# Patient Record
Sex: FEMALE | Race: WHITE | NOT HISPANIC OR LATINO | Employment: OTHER | URBAN - METROPOLITAN AREA
[De-identification: names, ages, dates, MRNs, and addresses within clinical notes are randomized per-mention and may not be internally consistent; named-entity substitution may affect disease eponyms.]

---

## 2018-05-01 LAB — TSH SERPL DL<=0.05 MIU/L-ACNC: 0.49 UIU/ML (ref 0.47–4.68)

## 2018-07-31 ENCOUNTER — TELEPHONE (OUTPATIENT)
Dept: FAMILY MEDICINE CLINIC | Facility: CLINIC | Age: 83
End: 2018-07-31

## 2018-08-21 DIAGNOSIS — E78.5 HYPERLIPIDEMIA, UNSPECIFIED HYPERLIPIDEMIA TYPE: Primary | ICD-10-CM

## 2018-08-21 RX ORDER — SIMVASTATIN 40 MG
TABLET ORAL
Qty: 90 TABLET | Refills: 3 | Status: SHIPPED | OUTPATIENT
Start: 2018-08-21 | End: 2019-07-30 | Stop reason: SDUPTHER

## 2018-09-05 ENCOUNTER — OFFICE VISIT (OUTPATIENT)
Dept: FAMILY MEDICINE CLINIC | Facility: CLINIC | Age: 83
End: 2018-09-05
Payer: COMMERCIAL

## 2018-09-05 VITALS
TEMPERATURE: 95.6 F | SYSTOLIC BLOOD PRESSURE: 116 MMHG | RESPIRATION RATE: 16 BRPM | HEIGHT: 64 IN | HEART RATE: 74 BPM | DIASTOLIC BLOOD PRESSURE: 78 MMHG | WEIGHT: 108 LBS | BODY MASS INDEX: 18.44 KG/M2

## 2018-09-05 DIAGNOSIS — I10 ESSENTIAL HYPERTENSION: ICD-10-CM

## 2018-09-05 DIAGNOSIS — E03.9 ACQUIRED HYPOTHYROIDISM: Primary | ICD-10-CM

## 2018-09-05 DIAGNOSIS — Z23 NEED FOR INFLUENZA VACCINATION: ICD-10-CM

## 2018-09-05 PROBLEM — G47.01 INSOMNIA DUE TO MEDICAL CONDITION: Status: ACTIVE | Noted: 2018-06-12

## 2018-09-05 PROCEDURE — 90662 IIV NO PRSV INCREASED AG IM: CPT

## 2018-09-05 PROCEDURE — 99214 OFFICE O/P EST MOD 30 MIN: CPT | Performed by: FAMILY MEDICINE

## 2018-09-05 PROCEDURE — 3078F DIAST BP <80 MM HG: CPT | Performed by: FAMILY MEDICINE

## 2018-09-05 PROCEDURE — 1036F TOBACCO NON-USER: CPT | Performed by: FAMILY MEDICINE

## 2018-09-05 PROCEDURE — 3008F BODY MASS INDEX DOCD: CPT | Performed by: FAMILY MEDICINE

## 2018-09-05 PROCEDURE — 1160F RVW MEDS BY RX/DR IN RCRD: CPT

## 2018-09-05 PROCEDURE — 90471 IMMUNIZATION ADMIN: CPT

## 2018-09-05 PROCEDURE — 3074F SYST BP LT 130 MM HG: CPT | Performed by: FAMILY MEDICINE

## 2018-09-05 PROCEDURE — 1160F RVW MEDS BY RX/DR IN RCRD: CPT | Performed by: FAMILY MEDICINE

## 2018-09-05 RX ORDER — LEVOTHYROXINE SODIUM 0.15 MG/1
150 TABLET ORAL EVERY 24 HOURS
COMMUNITY
Start: 2018-03-01 | End: 2019-03-13 | Stop reason: SDUPTHER

## 2018-09-05 RX ORDER — NITROGLYCERIN 0.4 MG/1
1 TABLET SUBLINGUAL AS NEEDED
COMMUNITY
Start: 2015-08-05

## 2018-09-05 RX ORDER — MULTIVIT-MIN/IRON/FOLIC ACID/K 18-600-40
2000 CAPSULE ORAL 2 TIMES DAILY
COMMUNITY
Start: 2015-01-14

## 2018-09-05 RX ORDER — SELEGILINE HYDROCHLORIDE 5 MG/1
2.5 TABLET ORAL DAILY
COMMUNITY
Start: 2018-06-12 | End: 2019-03-05

## 2018-09-05 NOTE — PATIENT INSTRUCTIONS
Melatonin take 1 mg 2 hr before sleep and use for at least 3 weeks to see if it works  If affective continue it  IF YOU HAVE NEVER HAD A TDAP SHOT (TETANUS/DIPHTHERIA/PERTUSSIS)  TALK TO YOUR PHARMACIST ABOUT GETTING ONE : GOOD FOR 10 YRS:ESPECIALLY IMPORTATN IF YOU HAVE YOUNG CHILDREN OR GRANDCHILDREN    GET A YEARLY FLU SHOT IN THE FALL : IF OVER 65 GET "HIGH DOSE"     ASK PHARMACIST ABOUT "SHINGRIX" THE NEW SHINGLES VACCINE IF OVER AGE 50      CURRENT [employer] TIPS ON EXERCISE:    IF YOU HAVE CONDITIONS THAT PREVENT AEROBIC EXERCISE:    WALK 30 MINUTES AT LEAST 5 DAYS PER WEEK; MAY BREAK IT UP INTO 10-  15 MINUTE SESSIONS   GET SOME RESISTANCE EXERCISES USING THE MAJOR MUSCLE GROUPS 2-3 TIMES PER WEEK     IF YOU ARE PHYSICALLY ABLE:    TRY TO GET 10,000 STEPS 3 TIMES PER WEEK  PLUS  GO "ONLINE" TO CHECK TARGET HEART RATE FOR YOUR AGE AND DO AEROBIC EXERCISES (JOG/STATIONARY BIKE/ELLIPTICAL) FOR 20-30 MINUTES 2-3 TIMES PER WEEK      MEASURES THAT HELP PREVENT FALLS:    DRINK PLENTY OF FLUIDS : 2-3 QTS PER DAY : URINE SHOULD BE LIGHT COLOR  GET REGULAR EYE CHECK UP  USE NIGHT LIGHTS  ELIMINATE ALL THROW RUGS  DO SOME WALKING EVERY DAY AND BALANCE EXERCISES  USE NON SLIP FOOT WEARBATH MATS AND GRAB BARS HELP  WHEN RISING: ALWAYS WAIT FOR 15-20 SECONDS BEFORE INITIATING WALK  CONSIDER DOING YOGA OR [facility]AC[facility]O [facility] MEASURES THAT HELP PREVENT FALLS:    DRINK PLENTY OF FLUIDS : 2-3 QTS PER DAY : URINE SHOULD BE LIGHT COLOR  GET REGULAR EYE CHECK UP  USE NIGHT LIGHTS  ELIMINATE ALL THROW RUGS  DO SOME WALKING EVERY DAY AND BALANCE EXERCISES  USE NON SLIP FOOT WEARBATH MATS AND GRAB BARS HELP  WHEN RISING: ALWAYS WAIT FOR 15-20 SECONDS BEFORE INITIATING WALK  CONSIDER DOING YOGA OR JAC[facility]O [facility]

## 2018-09-05 NOTE — PROGRESS NOTES
Assessment/Plan:    Acquired hypothyroidism  Controlled     Parkinson's disease ([facility] [address] )  As  Above : some nightmares        Diagnoses and all orders for this visit:    Acquired hypothyroidism    Essential hypertension    Other orders  -     levothyroxine (SYNTHROID) 150 mcg tablet; Take 150 mcg by mouth every 24 hours  -     selegiline (ELDEPRYL) 5 mg tablet; Take 2 5 mg by mouth daily  -     carbidopa-levodopa (SINEMET)  mg per tablet; Take 1 tablet by mouth 3 (three) times a day  -     Discontinue: aspirin 81 MG tablet; every 24 hours          Subjective:      Patient ID: Valorie Hampton is a 81 y o  female  Patient here for regular checkup  No recent hospital stays or emergency visits  She does get checkups from the cardiologist and the neurologist and the dermatologist on a regular basis  The following portions of the patient's history were reviewed and updated as appropriate: allergies, current medications, past family history, past medical history, past social history, past surgical history and problem list     Review of Systems   Constitutional: Negative for activity change and appetite change  HENT: Negative for voice change  Eyes: Negative for visual disturbance  Respiratory: Negative for chest tightness and shortness of breath  Cardiovascular: Negative for chest pain, palpitations and leg swelling  Gastrointestinal: Negative for abdominal pain, blood in stool, constipation and diarrhea  Genitourinary: Negative for dysuria, vaginal bleeding and vaginal discharge  Skin: Negative for rash  Neurological: Negative for dizziness  Psychiatric/Behavioral: Negative for dysphoric mood           Objective:  Vitals:    09/05/18 0847   BP: 116/78   BP Location: Left arm   Patient Position: Sitting   Cuff Size: Standard   Pulse: 74   Resp: 16   Temp: (!) 95 6 °F (35 3 °C)   TempSrc: Oral   Weight: 49 kg (108 lb)   Height: 5' 4" (1 626 m)      Physical Exam   Constitutional: She appears well-developed and well-nourished  HENT:   Head: Normocephalic and atraumatic  Eyes: Conjunctivae are normal    Neck: Neck supple  No thyromegaly present  Cardiovascular: Normal rate, regular rhythm, normal heart sounds and intact distal pulses  No murmur heard  Pulmonary/Chest: Effort normal and breath sounds normal  No respiratory distress  Musculoskeletal: She exhibits no edema  Lymphadenopathy:     She has no cervical adenopathy  Skin: Skin is warm and dry  Psychiatric: She has a normal mood and affect   Her behavior is normal

## 2019-03-05 ENCOUNTER — OFFICE VISIT (OUTPATIENT)
Dept: FAMILY MEDICINE CLINIC | Facility: CLINIC | Age: 84
End: 2019-03-05
Payer: COMMERCIAL

## 2019-03-05 VITALS
HEIGHT: 64 IN | WEIGHT: 103.4 LBS | DIASTOLIC BLOOD PRESSURE: 60 MMHG | SYSTOLIC BLOOD PRESSURE: 110 MMHG | HEART RATE: 70 BPM | BODY MASS INDEX: 17.65 KG/M2 | TEMPERATURE: 96 F | RESPIRATION RATE: 16 BRPM

## 2019-03-05 DIAGNOSIS — Z00.00 MEDICARE ANNUAL WELLNESS VISIT, SUBSEQUENT: ICD-10-CM

## 2019-03-05 DIAGNOSIS — E03.9 ACQUIRED HYPOTHYROIDISM: Primary | ICD-10-CM

## 2019-03-05 DIAGNOSIS — E78.5 HYPERLIPIDEMIA, UNSPECIFIED HYPERLIPIDEMIA TYPE: ICD-10-CM

## 2019-03-05 DIAGNOSIS — G20 PARKINSON'S DISEASE (HCC): ICD-10-CM

## 2019-03-05 DIAGNOSIS — I25.10 CHRONIC CORONARY ARTERY DISEASE: ICD-10-CM

## 2019-03-05 DIAGNOSIS — I10 ESSENTIAL HYPERTENSION: ICD-10-CM

## 2019-03-05 LAB — TSH SERPL DL<=0.05 MIU/L-ACNC: <0.007 UIU/ML (ref 0.36–3.74)

## 2019-03-05 PROCEDURE — 1160F RVW MEDS BY RX/DR IN RCRD: CPT | Performed by: FAMILY MEDICINE

## 2019-03-05 PROCEDURE — 3078F DIAST BP <80 MM HG: CPT | Performed by: FAMILY MEDICINE

## 2019-03-05 PROCEDURE — G0439 PPPS, SUBSEQ VISIT: HCPCS | Performed by: FAMILY MEDICINE

## 2019-03-05 PROCEDURE — 84443 ASSAY THYROID STIM HORMONE: CPT | Performed by: FAMILY MEDICINE

## 2019-03-05 PROCEDURE — 3074F SYST BP LT 130 MM HG: CPT | Performed by: FAMILY MEDICINE

## 2019-03-05 PROCEDURE — 1036F TOBACCO NON-USER: CPT | Performed by: FAMILY MEDICINE

## 2019-03-05 PROCEDURE — 99214 OFFICE O/P EST MOD 30 MIN: CPT | Performed by: FAMILY MEDICINE

## 2019-03-05 PROCEDURE — 36415 COLL VENOUS BLD VENIPUNCTURE: CPT | Performed by: FAMILY MEDICINE

## 2019-03-05 NOTE — PATIENT INSTRUCTIONS
MEASURES THAT HELP PREVENT FALLS:    DRINK PLENTY OF FLUIDS : 2-3 QTS PER DAY : URINE SHOULD BE LIGHT COLOR  GET REGULAR EYE CHECK UP  USE NIGHT LIGHTS  ELIMINATE ALL THROW RUGS  DO SOME WALKING EVERY DAY AND BALANCE EXERCISES  USE NON SLIP FOOT WEARBATH MATS AND GRAB BARS HELP  WHEN RISING: ALWAYS WAIT FOR 15-20 SECONDS BEFORE INITIATING WALK  CONSIDER DOING YOGA OR [facility]ACOBO [facility]I CURRENT [employer] TIPS ON EXERCISE:    IF YOU HAVE CONDITIONS THAT PREVENT AEROBIC EXERCISE:    WALK 30 MINUTES AT LEAST 5 DAYS PER WEEK; MAY BREAK IT UP INTO 10-  15 MINUTE SESSIONS   GET SOME RESISTANCE EXERCISES USING THE MAJOR MUSCLE GROUPS 2-3 TIMES PER WEEK     IF YOU ARE PHYSICALLY ABLE:    TRY TO GET 10,000 STEPS 3 TIMES PER WEEK  PLUS  GO "ONLINE" TO CHECK TARGET HEART RATE FOR YOUR AGE AND DO AEROBIC EXERCISES (JOG/STATIONARY BIKE/ELLIPTICAL) FOR 20-30 MINUTES 2-3 TIMES PER WEEK  WE RECOMMEND GETTING THE 2- DOSE SHINGLES VACCINE ([address] [place] [address] [address]) FROM YOUR PHARMACY  CHECK WITH THEM ON THE COST  YOU SHOULD HAVE THIS IF OVER AGE 48, EVEN IF YOU HAVE HAD THE ORIGINAL VACCINE (ZOSTRIX)

## 2019-03-05 NOTE — PROGRESS NOTES
Assessment/Plan:    No problem-specific Assessment & Plan notes found for this encounter  Diagnoses and all orders for this visit:    Acquired hypothyroidism    Chronic coronary artery disease    Essential hypertension    Parkinson's disease ([facility] [address] )    Hyperlipidemia, unspecified hyperlipidemia type    Other orders  -     aspirin 81 MG tablet; Take 81 mg by mouth          Subjective:      Patient ID: Antionette Fair is a 81 y o  female  PATIENT RETURNS FOR FOLLOW-UP OF CHRONIC MEDICAL CONDITIONS  NO HOSPITAL STAYS OR EMERGENCY VISITS RECENTLY  MEDS WERE REVIEWED AND NO SIDE EFFECTS  NO NEW ISSUES  UNLESS NOTED BELOW  NO NEW MEDICAL PROVIDER REPORTED  Awv:sub    Still sees neuro/           The following portions of the patient's history were reviewed and updated as appropriate: allergies, current medications, past family history, past medical history, past social history, past surgical history and problem list     Review of Systems   Constitutional: Negative for activity change and appetite change  HENT: Negative for voice change  Eyes: Negative for visual disturbance  Respiratory: Negative for chest tightness and shortness of breath  Cardiovascular: Negative for chest pain, palpitations and leg swelling  Gastrointestinal: Negative for abdominal pain, blood in stool, constipation and diarrhea  Genitourinary: Negative for dysuria, vaginal bleeding and vaginal discharge  Skin: Negative for rash  Neurological: Negative for dizziness  Psychiatric/Behavioral: Negative for dysphoric mood  Objective: There were no vitals filed for this visit  Physical Exam   Constitutional: She appears well-developed and well-nourished  HENT:   Right Ear: External ear normal    Left Ear: External ear normal    Mouth/Throat: Oropharynx is clear and moist    Eyes: Conjunctivae are normal    Neck: Neck supple  No thyromegaly present     Cardiovascular: Normal rate, regular rhythm and normal heart sounds  Pulmonary/Chest: Breath sounds normal  No respiratory distress  She has no wheezes  She has no rales  Lymphadenopathy:     She has no cervical adenopathy  Patient's chronic problems that were reviewed today are stable  Meds reviewed and no changes made  Appropriate labs and imaging were ordered  Preventive measures appropriate for age and sex were reviewed with patient  Immunizations were updated as appropriate  [facility]   Assessment and Plan:    Problem List Items Addressed This Visit        Endocrine    Acquired hypothyroidism - Primary       Cardiovascular and Mediastinum    Chronic coronary artery disease    Hypertension       Nervous and Auditory    Parkinson's disease ([address] )       Other    Hyperlipidemia        Health Maintenance Due   Topic Date Due    Fall Risk  05/30/2001    Urinary Incontinence Screening  05/30/2001         HPI:  Arlin Serna is a 81 y o  female here for her Subsequent Wellness Visit      Patient Active Problem List   Diagnosis    Acquired hypothyroidism    Atherosclerosis of native coronary artery of native heart without angina pectoris    Chronic coronary artery disease    Chronic total occlusion of coronary artery    Disorder of upper respiratory system    Dysthymic disorder    Hallucinations    History of rheumatic fever    Hyperlipidemia    Hypertension    Insomnia due to medical condition    Orthostatic hypotension    Mixed hyperlipidemia    Osteopenia    Parkinson's disease ([address] )     Past Medical History:   Diagnosis Date    Acquired hypothyroidism 4/29/2015    ASCVD (arteriosclerotic cardiovascular disease)     Atherosclerosis of native coronary artery of native heart without angina pectoris 4/29/2015    Dysthymic disorder 3/10/2011    Hypertension 6/10/2010    Memory loss      Past Surgical History:   Procedure Laterality Date    CORONARY STENT PLACEMENT      VITRECTOMY Left     left eye     Family History   Problem Relation Age of Onset    Colon cancer Mother     Stroke Father      Social History     Tobacco Use   Smoking Status Former Smoker    Packs/day: 0 75    Years: 15 00    Pack years: 11 25    Types: Cigarettes   Smokeless Tobacco Never Used     Social History     Substance and Sexual Activity   Alcohol Use Yes    Alcohol/week: 0 6 oz    Types: 1 Glasses of wine per week      Social History     Substance and Sexual Activity   Drug Use No       Current Outpatient Medications   Medication Sig Dispense Refill    carbidopa-levodopa (SINEMET)  mg per tablet Take 1 tablet by mouth 3 (three) times a day      Cholecalciferol (VITAMIN D) 2000 units CAPS Take 2,000 Units by mouth 2 (two) times a day      levothyroxine (SYNTHROID) 150 mcg tablet Take 150 mcg by mouth every 24 hours      nitroglycerin (NITROSTAT) 0 4 mg SL tablet Place 1 tablet under the tongue as needed      simvastatin (ZOCOR) 40 mg tablet TAKE 1 TABLET DAILY IN THE EVENING 90 tablet 3     No current facility-administered medications for this visit  Allergies   Allergen Reactions    Penicillins GI Intolerance and Diarrhea     Immunization History   Administered Date(s) Administered    INFLUENZA 10/24/2005, 10/06/2008, 09/30/2009, 01/31/2013, 01/08/2014, 10/27/2016, 09/01/2017    Influenza Split 09/20/2010, 10/25/2013    Influenza Split High Dose Preservative Free IM 10/17/2015, 10/27/2016, 09/01/2017    Influenza TIV (IM) 11/07/2011, 10/15/2012    Influenza, high dose seasonal 0 5 mL 09/05/2018    Pneumococcal Conjugate 13-Valent 02/05/2015    Pneumococcal Polysaccharide PPV23 01/10/2002    Tdap 05/23/2012    Zoster 11/29/2011       Patient Care Team:  Misti Eller MD as PCP - General (Family Medicine)    Medicare Screening Tests and Risk Assessments:      Broken Bones/Falls:     Fall Risk Assessment:    In the past year, patient has experienced: visual disturbance    Preventative Screening/Counseling: Cardiovascular:      General: Screening Current          Diabetes:      General: Screening Current          Colorectal Cancer:      General: Screening Not Indicated          Breast Cancer:      General: Screening Not Indicated          Cervical Cancer:      General: Screening Not Indicated          Osteoporosis:      General: Screening Current          AAA:      General: Screening Not Indicated          Glaucoma:      General: Screening Current          HIV:      General: Screening Not Indicated          Hepatitis C:      General: Screening Not Indicated        Advanced Directives:   Patient has living will for healthcare, has durable POA for healthcare, patient has an advanced directive       Immunizations:  Patient reviewed and up to date      Shingrix: Risks & Benefits Discussed

## 2019-03-05 NOTE — PROGRESS NOTES
Assessment and Plan:    Problem List Items Addressed This Visit        Endocrine    Acquired hypothyroidism - Primary       Cardiovascular and Mediastinum    Chronic coronary artery disease    Hypertension       Nervous and Auditory    Parkinson's disease ([facility] [address] )       Other    Hyperlipidemia        Health Maintenance Due   Topic Date Due    Fall Risk  05/30/2001    Urinary Incontinence Screening  05/30/2001         HPI:  Carl Shen is a 81 y o  female here for her    Patient Active Problem List   Diagnosis    Acquired hypothyroidism    Atherosclerosis of native coronary artery of native heart without angina pectoris    Chronic coronary artery disease    Chronic total occlusion of coronary artery    Disorder of upper respiratory system    Dysthymic disorder    Hallucinations    History of rheumatic fever    Hyperlipidemia    Hypertension    Insomnia due to medical condition    Orthostatic hypotension    Mixed hyperlipidemia    Osteopenia    Parkinson's disease ([facility] [address] )     Past Medical History:   Diagnosis Date    Acquired hypothyroidism 4/29/2015    ASCVD (arteriosclerotic cardiovascular disease)     Atherosclerosis of native coronary artery of native heart without angina pectoris 4/29/2015    Dysthymic disorder 3/10/2011    Hypertension 6/10/2010    Memory loss      Past Surgical History:   Procedure Laterality Date    CORONARY STENT PLACEMENT      VITRECTOMY Left     left eye     Family History   Problem Relation Age of Onset    Colon cancer Mother     Stroke Father      Social History     Tobacco Use   Smoking Status Former Smoker    Packs/day: 0 75    Years: 15 00    Pack years: 11 25    Types: Cigarettes   Smokeless Tobacco Never Used     Social History     Substance and Sexual Activity   Alcohol Use Yes    Alcohol/week: 0 6 oz    Types: 1 Glasses of wine per week      Social History     Substance and Sexual Activity   Drug Use No       Current Outpatient Medications   Medication Sig Dispense Refill    carbidopa-levodopa (SINEMET)  mg per tablet Take 1 tablet by mouth 3 (three) times a day      Cholecalciferol (VITAMIN D) 2000 units CAPS Take 2,000 Units by mouth 2 (two) times a day      levothyroxine (SYNTHROID) 150 mcg tablet Take 150 mcg by mouth every 24 hours      nitroglycerin (NITROSTAT) 0 4 mg SL tablet Place 1 tablet under the tongue as needed      simvastatin (ZOCOR) 40 mg tablet TAKE 1 TABLET DAILY IN THE EVENING 90 tablet 3    aspirin 81 MG tablet Take 81 mg by mouth      selegiline (ELDEPRYL) 5 mg tablet Take 2 5 mg by mouth daily       No current facility-administered medications for this visit  Allergies   Allergen Reactions    Penicillins GI Intolerance and Diarrhea     Immunization History   Administered Date(s) Administered    INFLUENZA 10/24/2005, 10/06/2008, 09/30/2009, 01/31/2013, 01/08/2014, 10/27/2016, 09/01/2017    Influenza Split 09/20/2010, 10/25/2013    Influenza Split High Dose Preservative Free IM 10/17/2015, 10/27/2016, 09/01/2017    Influenza TIV (IM) 11/07/2011, 10/15/2012    Influenza, high dose seasonal 0 5 mL 09/05/2018    Pneumococcal Conjugate 13-Valent 02/05/2015    Pneumococcal Polysaccharide PPV23 01/10/2002    Tdap 05/23/2012    Zoster 11/29/2011       Patient Care Team:  Moe Worthington MD as PCP - General (Family Medicine)    Medicare Screening Tests and Risk Assessments:  Lauryn Schreiber is here for her Subsequent Wellness visit  Health Risk Assessment:  Patient rates overall health as fair  Patient feels that their physical health rating is Same  Eyesight was rated as Same  Hearing was rated as Same  Patient feels that their emotional and mental health rating is Slightly worse  Pain experienced by patient in the last 7 days has been None  Patient states that she has experienced no weight loss or gain in last 6 months  Emotional/Mental Health:  Patient has been feeling nervous/anxious      PHQ-9 Depression Screening: Frequency of the following problems over the past two weeks:      1  Little interest or pleasure in doing things: 0 - not at all      2  Feeling down, depressed, or hopeless: 3 - nearly every day      3  Trouble falling or staying asleep, or sleeping too much: 3 - nearly every day      4  Feeling tired or having little energy: 2 - more than half the days      5  Poor appetite or overeatin - several days      6  Feeling bad about yourself - or that you are a failure or have let yourself or your family down: 1 - several days      7  Trouble concentrating on things, such as reading the newspaper or watching television: 2 - more than half the days      8  Moving or speaking so slowly that other people could have noticed  Or the opposite - being so fidgety or restless that you have been moving around a lot more than usual: 0 - not at all      9  Thoughts that you would be better off dead, or of hurting yourself in some way: 0 - not at all  PHQ-2 Score: 3  PHQ-9 Score: 12    Broken Bones/Falls: Fall Risk Assessment:    In the past year, patient has experienced: No history of falling in past year          Bladder/Bowel:  Patient has not leaked urine accidently in the last six months  Patient reports no loss of bowel control  Immunizations:  Patient has had a flu vaccination within the last year  Patient has received a pneumonia shot  Patient has received a shingles shot  Patient has received tetanus/diphtheria shot  Date of tetanus/diphtheria shot: 2012    Home Safety:  Patient has trouble with stairs inside or outside of their home  Patient currently reports that there are safety hazards present in home , working smoke alarms, working carbon monoxide detectors        Preventative Screenings:   No breast cancer screening performed, no colon cancer screen completed, cholesterol screen completed, 2019  glaucoma eye exam completed, 10/31/2018      Nutrition:  Current diet: Regular with servings of the following:    Medications:  Patient is currently taking over-the-counter supplements  List of OTC medications includes: multivitamin  Patient is not able to manage medications  (Additional Comments: Sometimes she can do it herself, but most of time  helps  )Lifestyle Choices:  Patient reports no tobacco use  Patient has not smoked or used tobacco in the past   Patient reports no alcohol use  Patient does not drive a vehicle  Patient wears seat belt  Activities of Daily Living:  Can get out of bed by his or her self, able to dress self, able to make own meals, unable to do own shopping, able to bathe self, can do own laundry/housekeeping, unable to manage own money and other related tasks    Previous Hospitalizations:  No hospitalization or ED visit in past 12 months        Advanced Directives:  Patient has decided on a power of   Patient has spoken to designated power of   Patient has completed advanced directive

## 2019-03-05 NOTE — PROGRESS NOTES
Assessment/Plan:    No problem-specific Assessment & Plan notes found for this encounter  Diagnoses and all orders for this visit:    Acquired hypothyroidism  -     TSH, 3rd generation    Chronic coronary artery disease    Essential hypertension    Parkinson's disease ([facility] [address] )    Hyperlipidemia, unspecified hyperlipidemia type    Medicare annual wellness visit, subsequent    Other orders  -     Discontinue: aspirin 81 MG tablet; Take 81 mg by mouth              Subjective:      Patient ID: Mag Maldonado is a 81 y o  female      HPI    The following portions of the patient's history were reviewed and updated as appropriate: allergies, current medications, past family history, past medical history, past social history, past surgical history and problem list     Review of Systems      Objective:  Vitals:    03/05/19 0812   BP: 110/60   BP Location: Right arm   Patient Position: Sitting   Cuff Size: Standard   Pulse: 70   Resp: 16   Temp: (!) 96 °F (35 6 °C)   Weight: 46 9 kg (103 lb 6 4 oz)   Height: 5' 4" (1 626 m)      Physical Exam

## 2019-03-12 ENCOUNTER — CLINICAL SUPPORT (OUTPATIENT)
Dept: FAMILY MEDICINE CLINIC | Facility: CLINIC | Age: 84
End: 2019-03-12
Payer: COMMERCIAL

## 2019-03-12 DIAGNOSIS — E03.9 ACQUIRED HYPOTHYROIDISM: Primary | ICD-10-CM

## 2019-03-12 LAB — TSH SERPL DL<=0.05 MIU/L-ACNC: <0.007 UIU/ML (ref 0.36–3.74)

## 2019-03-12 PROCEDURE — 36415 COLL VENOUS BLD VENIPUNCTURE: CPT

## 2019-03-12 PROCEDURE — 84443 ASSAY THYROID STIM HORMONE: CPT | Performed by: FAMILY MEDICINE

## 2019-03-13 ENCOUNTER — TREATMENT (OUTPATIENT)
Dept: FAMILY MEDICINE CLINIC | Facility: CLINIC | Age: 84
End: 2019-03-13

## 2019-03-13 DIAGNOSIS — E03.9 ACQUIRED HYPOTHYROIDISM: ICD-10-CM

## 2019-03-13 DIAGNOSIS — E03.9 ACQUIRED HYPOTHYROIDISM: Primary | ICD-10-CM

## 2019-03-13 RX ORDER — LEVOTHYROXINE SODIUM 0.12 MG/1
125 TABLET ORAL EVERY 24 HOURS
Qty: 90 TABLET | Refills: 3 | Status: SHIPPED | OUTPATIENT
Start: 2019-03-13 | End: 2019-03-13 | Stop reason: SDUPTHER

## 2019-03-13 RX ORDER — LEVOTHYROXINE SODIUM 0.12 MG/1
125 TABLET ORAL EVERY 24 HOURS
Qty: 30 TABLET | Refills: 12 | Status: SHIPPED | OUTPATIENT
Start: 2019-03-13

## 2019-03-13 NOTE — TELEPHONE ENCOUNTER
Patient  called he said that you don't need to call the Rx in for the Levothyroxine 125 mg that she has 50 pills left so he asked not to call the Rx in   [address] [place] [address]

## 2019-07-30 DIAGNOSIS — E78.5 HYPERLIPIDEMIA, UNSPECIFIED HYPERLIPIDEMIA TYPE: ICD-10-CM

## 2019-07-31 RX ORDER — SIMVASTATIN 40 MG
TABLET ORAL
Qty: 90 TABLET | Refills: 3 | Status: SHIPPED | OUTPATIENT
Start: 2019-07-31

## 2019-09-05 ENCOUNTER — OFFICE VISIT (OUTPATIENT)
Dept: FAMILY MEDICINE CLINIC | Facility: CLINIC | Age: 84
End: 2019-09-05
Payer: COMMERCIAL

## 2019-09-05 VITALS
SYSTOLIC BLOOD PRESSURE: 140 MMHG | TEMPERATURE: 97.5 F | BODY MASS INDEX: 18.43 KG/M2 | HEIGHT: 63 IN | WEIGHT: 104 LBS | HEART RATE: 62 BPM | OXYGEN SATURATION: 99 % | DIASTOLIC BLOOD PRESSURE: 70 MMHG

## 2019-09-05 DIAGNOSIS — E03.9 ACQUIRED HYPOTHYROIDISM: ICD-10-CM

## 2019-09-05 DIAGNOSIS — Z23 IMMUNIZATION DUE: Primary | ICD-10-CM

## 2019-09-05 DIAGNOSIS — G20 PARKINSON'S DISEASE (HCC): ICD-10-CM

## 2019-09-05 DIAGNOSIS — E78.2 MIXED HYPERLIPIDEMIA: ICD-10-CM

## 2019-09-05 DIAGNOSIS — I25.10 CHRONIC CORONARY ARTERY DISEASE: ICD-10-CM

## 2019-09-05 DIAGNOSIS — G47.01 INSOMNIA DUE TO MEDICAL CONDITION: ICD-10-CM

## 2019-09-05 PROCEDURE — 90662 IIV NO PRSV INCREASED AG IM: CPT

## 2019-09-05 PROCEDURE — G0008 ADMIN INFLUENZA VIRUS VAC: HCPCS

## 2019-09-05 PROCEDURE — 99214 OFFICE O/P EST MOD 30 MIN: CPT | Performed by: FAMILY MEDICINE

## 2019-09-05 NOTE — PROGRESS NOTES
Assessment/Plan:    No problem-specific Assessment & Plan notes found for this encounter  Diagnoses and all orders for this visit:    Immunization due  -     PREFERRED: influenza vaccine, [id number], high-dose, PF 0 5 mL, for patients 65 yr+ (FLUZONE HIGH-DOSE)    Chronic coronary artery disease    Acquired hypothyroidism    Parkinson's disease ([facility] [address] )    Mixed hyperlipidemia    Insomnia due to medical condition    Other orders  -     ASPIRIN 81 PO; Take 81 mg by mouth daily          Subjective:      Patient ID: Sita Mchugh is a 81 y o  female  PATIENT RETURNS FOR FOLLOW-UP OF CHRONIC MEDICAL CONDITIONS  NO HOSPITAL STAYS OR EMERGENCY VISITS RECENTLY  MEDS WERE REVIEWED AND NO SIDE EFFECTS  NO NEW ISSUES  UNLESS NOTED BELOW  NO NEW MEDICAL PROVIDER REPORTED  THE CHRONIC DISEASES LISTED ABOVE ARE STABLE AND UNCHANGED/ THE PLAN OF CARE FOR THOSE WILL REMAIN UNCHANGED UNLESS NOTED BELOW  The following portions of the patient's history were reviewed and updated as appropriate: allergies, current medications, past family history, past medical history, past social history, past surgical history and problem list     Review of Systems   Constitutional: Negative for activity change and appetite change  HENT: Negative for voice change  Eyes: Negative for visual disturbance  Respiratory: Negative for chest tightness and shortness of breath  Cardiovascular: Negative for chest pain, palpitations and leg swelling  Gastrointestinal: Negative for abdominal pain, blood in stool, constipation and diarrhea  Genitourinary: Negative for dysuria, vaginal bleeding and vaginal discharge  Skin: Negative for rash  Neurological: Negative for dizziness  Psychiatric/Behavioral: Negative for dysphoric mood           Objective:  Vitals:    09/05/19 0838   BP: 140/70   BP Location: Left arm   Patient Position: Sitting   Cuff Size: Adult   Pulse: 62   Temp: 97 5 °F (36 4 °C)   TempSrc: Temporal   SpO2: 99% Weight: 47 2 kg (104 lb)   Height: 5' 3" (1 6 m)      Physical Exam   Constitutional: She appears well-developed and well-nourished  HENT:   Head: Normocephalic and atraumatic  Eyes: Conjunctivae are normal    Neck: Neck supple  No thyromegaly present  Cardiovascular: Normal rate, regular rhythm, normal heart sounds and intact distal pulses  No murmur heard  Pulmonary/Chest: Effort normal and breath sounds normal  No respiratory distress  Musculoskeletal: She exhibits no edema  Lymphadenopathy:     She has no cervical adenopathy  Skin: Skin is warm and dry  Psychiatric: She has a normal mood and affect  Her behavior is normal          Patient's chronic problems that were reviewed today are stable  Meds reviewed and no changes made  Appropriate labs and imaging were ordered  Preventive measures appropriate for age and sex were reviewed with patient  Immunizations were updated as appropriate

## 2019-09-05 NOTE — PATIENT INSTRUCTIONS
Check the dose of levothyroxine at home  I have listed 125 mcg  If that is not what she is taking call me and let me know    Check with the neurologist about the possibility of medical marijuana

## 2020-07-13 ENCOUNTER — TELEPHONE (OUTPATIENT)
Dept: FAMILY MEDICINE CLINIC | Facility: CLINIC | Age: 85
End: 2020-07-13